# Patient Record
Sex: FEMALE | Race: WHITE | Employment: OTHER | URBAN - METROPOLITAN AREA
[De-identification: names, ages, dates, MRNs, and addresses within clinical notes are randomized per-mention and may not be internally consistent; named-entity substitution may affect disease eponyms.]

---

## 2017-10-25 ENCOUNTER — HOSPITAL ENCOUNTER (OUTPATIENT)
Dept: MAMMOGRAPHY | Age: 62
Discharge: HOME | End: 2017-10-25
Attending: OBSTETRICS & GYNECOLOGY
Payer: COMMERCIAL

## 2017-10-25 DIAGNOSIS — Z12.31 VISIT FOR SCREENING MAMMOGRAM: ICD-10-CM

## 2017-10-25 PROCEDURE — 77067 SCR MAMMO BI INCL CAD: CPT

## 2018-10-31 ENCOUNTER — HOSPITAL ENCOUNTER (OUTPATIENT)
Dept: MAMMOGRAPHY | Age: 63
Discharge: HOME | End: 2018-10-31
Attending: OBSTETRICS & GYNECOLOGY
Payer: COMMERCIAL

## 2018-10-31 DIAGNOSIS — Z12.39 SCREENING BREAST EXAMINATION: ICD-10-CM

## 2018-10-31 PROCEDURE — 77067 SCR MAMMO BI INCL CAD: CPT

## 2020-01-13 ENCOUNTER — HOSPITAL ENCOUNTER (OUTPATIENT)
Dept: MAMMOGRAPHY | Age: 65
Discharge: HOME | End: 2020-01-13
Attending: OBSTETRICS & GYNECOLOGY
Payer: COMMERCIAL

## 2020-01-13 DIAGNOSIS — Z12.31 VISIT FOR SCREENING MAMMOGRAM: ICD-10-CM

## 2020-01-13 PROCEDURE — 77067 SCR MAMMO BI INCL CAD: CPT

## 2021-11-03 ENCOUNTER — TRANSCRIBE ORDER (OUTPATIENT)
Dept: SCHEDULING | Age: 66
End: 2021-11-03

## 2021-11-03 DIAGNOSIS — Z12.31 SCREENING MAMMOGRAM FOR HIGH-RISK PATIENT: Primary | ICD-10-CM

## 2021-12-09 ENCOUNTER — HOSPITAL ENCOUNTER (OUTPATIENT)
Dept: MAMMOGRAPHY | Age: 66
Discharge: HOME | End: 2021-12-09
Attending: OBSTETRICS & GYNECOLOGY
Payer: COMMERCIAL

## 2021-12-09 DIAGNOSIS — Z12.31 SCREENING MAMMOGRAM FOR HIGH-RISK PATIENT: ICD-10-CM

## 2021-12-09 PROCEDURE — 77063 BREAST TOMOSYNTHESIS BI: CPT

## 2021-12-09 PROCEDURE — 77067 SCR MAMMO BI INCL CAD: CPT

## 2023-02-16 ENCOUNTER — TRANSCRIBE ORDER (OUTPATIENT)
Dept: MAMMOGRAPHY | Age: 68
End: 2023-02-16

## 2023-02-16 DIAGNOSIS — Z12.31 VISIT FOR SCREENING MAMMOGRAM: Primary | ICD-10-CM

## 2023-02-17 ENCOUNTER — HOSPITAL ENCOUNTER (OUTPATIENT)
Dept: MAMMOGRAPHY | Age: 68
End: 2023-02-17
Payer: MEDICARE

## 2023-02-17 DIAGNOSIS — Z12.31 VISIT FOR SCREENING MAMMOGRAM: ICD-10-CM

## 2023-02-17 PROCEDURE — 77063 BREAST TOMOSYNTHESIS BI: CPT

## 2024-04-23 ENCOUNTER — HOSPITAL ENCOUNTER (OUTPATIENT)
Facility: HOSPITAL | Age: 69
Discharge: HOME | End: 2024-04-26
Attending: OBSTETRICS & GYNECOLOGY
Payer: MEDICARE

## 2024-04-23 VITALS — WEIGHT: 160 LBS | HEIGHT: 69 IN | BODY MASS INDEX: 23.7 KG/M2

## 2024-04-23 DIAGNOSIS — Z12.31 SCREENING MAMMOGRAM FOR HIGH-RISK PATIENT: ICD-10-CM

## 2024-04-23 PROCEDURE — 77067 SCR MAMMO BI INCL CAD: CPT

## 2024-12-06 ENCOUNTER — TRANSCRIBE ORDERS (OUTPATIENT)
Facility: HOSPITAL | Age: 69
End: 2024-12-06

## 2024-12-06 DIAGNOSIS — M47.819 SPONDYLOSIS WITHOUT MYELOPATHY: Primary | ICD-10-CM

## 2025-01-10 ENCOUNTER — HOSPITAL ENCOUNTER (OUTPATIENT)
Facility: HOSPITAL | Age: 70
Discharge: HOME | End: 2025-01-13
Payer: MEDICARE

## 2025-01-10 DIAGNOSIS — M47.819 SPONDYLOSIS WITHOUT MYELOPATHY: ICD-10-CM

## 2025-01-10 PROCEDURE — 72148 MRI LUMBAR SPINE W/O DYE: CPT

## 2025-04-28 ENCOUNTER — TRANSCRIBE ORDERS (OUTPATIENT)
Facility: HOSPITAL | Age: 70
End: 2025-04-28

## 2025-04-28 DIAGNOSIS — Z12.31 SCREENING MAMMOGRAM, ENCOUNTER FOR: Primary | ICD-10-CM

## 2025-04-28 DIAGNOSIS — M81.0 AGE-RELATED OSTEOPOROSIS WITHOUT CURRENT PATHOLOGICAL FRACTURE: Primary | ICD-10-CM

## 2025-05-21 NOTE — PRE-PROCEDURE INSTRUCTIONS
5/21/2025  3:03 p.m.  Spoke with patient and confirmed arrival time of 10:15 a.m. for appointments scheduled on 5/28/2025; instructions given.

## 2025-05-28 ENCOUNTER — HOSPITAL ENCOUNTER (OUTPATIENT)
Facility: HOSPITAL | Age: 70
Discharge: HOME | End: 2025-05-31
Payer: MEDICARE

## 2025-05-28 VITALS — WEIGHT: 160 LBS | HEIGHT: 69 IN | BODY MASS INDEX: 23.7 KG/M2

## 2025-05-28 DIAGNOSIS — M81.0 AGE-RELATED OSTEOPOROSIS WITHOUT CURRENT PATHOLOGICAL FRACTURE: ICD-10-CM

## 2025-05-28 DIAGNOSIS — Z12.31 SCREENING MAMMOGRAM, ENCOUNTER FOR: ICD-10-CM

## 2025-05-28 PROCEDURE — 77080 DXA BONE DENSITY AXIAL: CPT

## 2025-05-28 PROCEDURE — 77063 BREAST TOMOSYNTHESIS BI: CPT

## 2025-06-12 ENCOUNTER — ANESTHESIA (OUTPATIENT)
Facility: HOSPITAL | Age: 70
End: 2025-06-12
Payer: MEDICARE

## 2025-06-12 ENCOUNTER — HOSPITAL ENCOUNTER (OUTPATIENT)
Facility: HOSPITAL | Age: 70
Setting detail: OUTPATIENT SURGERY
Discharge: HOME | End: 2025-06-12
Attending: INTERNAL MEDICINE | Admitting: INTERNAL MEDICINE
Payer: MEDICARE

## 2025-06-12 ENCOUNTER — ANESTHESIA EVENT (OUTPATIENT)
Facility: HOSPITAL | Age: 70
End: 2025-06-12
Payer: MEDICARE

## 2025-06-12 VITALS
OXYGEN SATURATION: 100 % | RESPIRATION RATE: 12 BRPM | HEIGHT: 69 IN | SYSTOLIC BLOOD PRESSURE: 136 MMHG | TEMPERATURE: 98.3 F | HEART RATE: 79 BPM | DIASTOLIC BLOOD PRESSURE: 69 MMHG | BODY MASS INDEX: 21.71 KG/M2 | WEIGHT: 146.61 LBS

## 2025-06-12 PROCEDURE — 88305 TISSUE EXAM BY PATHOLOGIST: CPT

## 2025-06-12 PROCEDURE — 2580000003 HC RX 258: Performed by: INTERNAL MEDICINE

## 2025-06-12 PROCEDURE — 3600007512: Performed by: INTERNAL MEDICINE

## 2025-06-12 PROCEDURE — 6360000002 HC RX W HCPCS: Performed by: NURSE ANESTHETIST, CERTIFIED REGISTERED

## 2025-06-12 PROCEDURE — 7100000011 HC PHASE II RECOVERY - ADDTL 15 MIN: Performed by: INTERNAL MEDICINE

## 2025-06-12 PROCEDURE — 3700000001 HC ADD 15 MINUTES (ANESTHESIA): Performed by: INTERNAL MEDICINE

## 2025-06-12 PROCEDURE — 7100000010 HC PHASE II RECOVERY - FIRST 15 MIN: Performed by: INTERNAL MEDICINE

## 2025-06-12 PROCEDURE — 2709999900 HC NON-CHARGEABLE SUPPLY: Performed by: INTERNAL MEDICINE

## 2025-06-12 PROCEDURE — 3700000000 HC ANESTHESIA ATTENDED CARE: Performed by: INTERNAL MEDICINE

## 2025-06-12 PROCEDURE — 3600007502: Performed by: INTERNAL MEDICINE

## 2025-06-12 RX ORDER — SODIUM CHLORIDE 9 MG/ML
INJECTION, SOLUTION INTRAVENOUS CONTINUOUS
Status: DISCONTINUED | OUTPATIENT
Start: 2025-06-12 | End: 2025-06-12 | Stop reason: HOSPADM

## 2025-06-12 RX ORDER — PROPOFOL 10 MG/ML
INJECTION, EMULSION INTRAVENOUS
Status: DISCONTINUED | OUTPATIENT
Start: 2025-06-12 | End: 2025-06-12 | Stop reason: SDUPTHER

## 2025-06-12 RX ORDER — PHENYLEPHRINE HCL IN 0.9% NACL 0.4MG/10ML
SYRINGE (ML) INTRAVENOUS
Status: DISCONTINUED | OUTPATIENT
Start: 2025-06-12 | End: 2025-06-12 | Stop reason: SDUPTHER

## 2025-06-12 RX ORDER — PRAVASTATIN SODIUM 40 MG
40 TABLET ORAL NIGHTLY
COMMUNITY

## 2025-06-12 RX ORDER — LISINOPRIL 40 MG/1
40 TABLET ORAL DAILY
COMMUNITY
Start: 2025-05-01

## 2025-06-12 RX ORDER — AMLODIPINE BESYLATE 10 MG/1
10 TABLET ORAL DAILY
COMMUNITY
Start: 2025-04-07

## 2025-06-12 RX ORDER — LIDOCAINE HYDROCHLORIDE 20 MG/ML
INJECTION, SOLUTION EPIDURAL; INFILTRATION; INTRACAUDAL; PERINEURAL
Status: DISCONTINUED | OUTPATIENT
Start: 2025-06-12 | End: 2025-06-12 | Stop reason: SDUPTHER

## 2025-06-12 RX ORDER — UBIDECARENONE 75 MG
50 CAPSULE ORAL DAILY
COMMUNITY

## 2025-06-12 RX ORDER — MULTIVIT WITH MINERALS/LUTEIN
250 TABLET ORAL DAILY
COMMUNITY

## 2025-06-12 RX ADMIN — SODIUM CHLORIDE: 9 INJECTION, SOLUTION INTRAVENOUS at 10:53

## 2025-06-12 RX ADMIN — PROPOFOL 50 MG: 10 INJECTION, EMULSION INTRAVENOUS at 10:58

## 2025-06-12 RX ADMIN — Medication 100 MCG: at 11:13

## 2025-06-12 RX ADMIN — Medication 100 MCG: at 11:08

## 2025-06-12 RX ADMIN — PROPOFOL 30 MG: 10 INJECTION, EMULSION INTRAVENOUS at 11:05

## 2025-06-12 RX ADMIN — PROPOFOL 150 MCG/KG/MIN: 10 INJECTION, EMULSION INTRAVENOUS at 10:59

## 2025-06-12 RX ADMIN — LIDOCAINE HYDROCHLORIDE 50 MG: 20 INJECTION, SOLUTION EPIDURAL; INFILTRATION; INTRACAUDAL; PERINEURAL at 10:58

## 2025-06-12 ASSESSMENT — PAIN - FUNCTIONAL ASSESSMENT: PAIN_FUNCTIONAL_ASSESSMENT: 0-10

## 2025-06-12 ASSESSMENT — PAIN SCALES - GENERAL: PAINLEVEL_OUTOF10: 0

## 2025-06-12 NOTE — H&P
[facility]  Rich Rice M.D.  [phone]    History and Physical       NAME:  Miriam Lee   :   1955   MRN:   [id number]           Consult Date: 2025 10:53 AM    Chief Complaint:  Colon cancer screening    History of Present Illness:  Patient is a 70 y.o. who is seen for colon cancer screening. Denies any ongoing GI complaints.      PMH:  Past Medical History:   Diagnosis Date    Hyperlipidemia     Hypertension        PSH:  Past Surgical History:   Procedure Laterality Date    FIXATION KYPHOPLASTY LUMBAR SPINE      HYSTERECTOMY (CERVIX STATUS UNKNOWN)         Allergies:  No Known Allergies    Home Medications:  Prior to Admission Medications   Prescriptions Last Dose Informant Patient Reported? Taking?   Ascorbic Acid (VITAMIN C) 250 MG tablet 2025 Morning  Yes Yes   Sig: Take 1 tablet by mouth daily   amLODIPine (NORVASC) 10 MG tablet 6/10/2025 Bedtime  Yes Yes   Sig: Take 1 tablet by mouth daily   lisinopril (PRINIVIL;ZESTRIL) 40 MG tablet 6/10/2025 Bedtime  Yes Yes   Sig: Take 1 tablet by mouth daily   pravastatin (PRAVACHOL) 40 MG tablet 6/10/2025 Bedtime  Yes No   Sig: Take 1 tablet by mouth nightly at bedtime   vitamin B-12 (CYANOCOBALAMIN) 100 MCG tablet 2025 Morning  Yes Yes   Sig: Take 0.5 tablets by mouth daily   vitamin D (CHOLECALCIFEROL) 50 MCG (2000 UT) CAPS capsule 6/10/2025 Evening  Yes No   Sig: Take 1 capsule by mouth daily      Facility-Administered Medications: None       Hospital Medications:  Current Facility-Administered Medications   Medication Dose Route Frequency    0.9 % sodium chloride infusion   IntraVENous Continuous       Social History:  Social History     Tobacco Use    Smoking status: Every Day     Current packs/day: 0.25     Types: Cigarettes    Smokeless tobacco: Never   Substance Use Topics    Alcohol use: Yes     Alcohol/week: 3.0 standard drinks of alcohol     Types: 3 Glasses of wine per week       Family History:  Family  History   Problem Relation Age of Onset    Hypertension Mother     Diabetes Father     Hypertension Father     Kidney Disease Father              Review of Systems:      Constitutional: negative fever, negative chills, negative weight loss  Eyes:   negative visual changes  ENT:   negative sore throat, tongue or lip swelling  Respiratory:  negative cough, negative dyspnea  Cards:  negative for chest pain, palpitations, lower extremity edema  GI:   See HPI  :  negative for frequency, dysuria  Integument:  negative for rash and pruritus  Heme:  negative for easy bruising and gum/nose bleeding  Musculoskel: negative for myalgias,  back pain and muscle weakness  Neuro: negative for headaches, dizziness, vertigo  Psych:  negative for feelings of anxiety, depression       Objective:   Patient Vitals for the past 8 hrs:   BP Temp Pulse Resp SpO2 Height Weight   06/12/25 0925 129/62 98.3 °F (36.8 °C) 86 16 100 % 1.753 m (5' 9\") 66.5 kg (146 lb 9.7 oz)     No intake/output data recorded.  No intake/output data recorded.    EXAM:     NEURO-a&o   HEENT-wnl   LUNGS-clear    COR-regular rate and rhythym     ABD-soft , no tenderness, no rebound, good bs     EXT-no edema     Data Review     No results for input(s): \"WBC\", \"HGB\", \"HCT\", \"PLT\" in the last 72 hours.  No results for input(s): \"NA\", \"K\", \"CL\", \"CO2\", \"BUN\", \"GLU\", \"PHOS\" in the last 72 hours.    Invalid input(s): \"CREA\", \"CA\"  No results for input(s): \"TP\", \"GLOB\", \"GGT\" in the last 72 hours.    Invalid input(s): \"SGOT\", \"GPT\", \"AP\", \"TBIL\", \"ALB\", \"AML\", \"AMYP\", \"LPSE\", \"HLPSE\"  No results for input(s): \"INR\", \"APTT\" in the last 72 hours.    Invalid input(s): \"PTP\"    There is no problem list on file for this patient.     Assessment:     Colon cancer screening   Plan:     Colonoscopy today.     Signed By: Rich Rice MD     6/12/2025  10:53 AM

## 2025-06-12 NOTE — PERIOP NOTE
Received recovery report from anesthesia team, see anesthesia note. Abdomen remains soft and non-tender post-procedure. Pt has no complaints at this time and tolerated procedure well. Endoscope was pre-cleaned at the bedside by Maria Ines Magdaleno immediately following procedure. Post recovery report given to Soraya Ferrara.

## 2025-06-12 NOTE — OP NOTE
[facility]  Rich Rice M.D.  [phone]            2025          Colonoscopy Operative Report  Miriam Lee  :  1955  [facility] Medical Record Number:  [id number]      Indications:    Screening colonoscopy     :  Rich Rice MD    Referring Provider: Aracely Lam APRN - NP    Sedation:  MAC anesthesia    Pre-Procedural Exam:      Airway: clear,  No airway problems anticipated  Heart: RRR, without gallops or rubs  Lungs: clear bilaterally without wheezes, crackles, or rhonchi  Abdomen: soft, nontender, nondistended, bowel sounds present  Mental Status: awake, alert and oriented to person, place and time     Procedure Details:  After informed consent was obtained with all risks and benefits of procedure explained and preoperative exam completed, the patient was taken to the endoscopy suite and placed in the left lateral decubitus position.  Upon sequential sedation as per above, a digital rectal exam was performed. The Olympus videocolonoscope  was inserted in the rectum and carefully advanced to the cecum, which was identified by the ileocecal valve and appendiceal orifice.  The quality of preparation was good.  The colonoscope was slowly withdrawn with careful inspection and evaluation between folds. Retroflexion in the rectum was performed.    Findings:   Terminal Ileum: not intubated  Cecum: normal  Ascending Colon: normal  Transverse Colon: normal  Descending Colon: normal  Sigmoid: mild diverticulosis; tortuous lumen, probably related to previous hysterectomy  Rectum: 1  Pedunculated polyp(s), the largest 15 mm in size  Grade 1 internal hemorrhoid(s);    Interventions:  1 complete polypectomy were performed using hot snare  and the polyps were  retrieved    Specimen Removed:  specimen #1, 15 mm in size, located in the rectum removed by snare cautery and retrieved for pathology    Complications: None.     EBL:  None.    Impression:  One polyp removed from

## 2025-06-12 NOTE — DISCHARGE INSTRUCTIONS
[facility]  Rich Rice M.D.  [phone]            COLON DISCHARGE INSTRUCTIONS       2025    Miriam Lee  :  1955  [facility] Medical Record Number:  [id number]      COLONOSCOPY FINDINGS:  Your colonoscopy showed: One polyp that was removed and sent to pathology, mild diverticulosis, otherwise normal mucosa.    DISCOMFORT:  Redness at IV site- apply warm compress to area; if redness or soreness persist- contact your physician  There may be a slight amount of blood passed from the rectum  Gaseous discomfort- walking, belching will help relieve any discomfort  You may not operate a vehicle for 12 hours  You may not engage in an occupation involving machinery or appliances for rest of today  You may not drink alcoholic beverages for at least 12 hours  Avoid making any critical decisions for at least 24 hour  DIET:   High fiber diet   - however -  remember your colon is empty and a heavy meal will produce gas.   Avoid these foods:  vegetables, fried / greasy foods, carbonated drinks for today     ACTIVITY:  You may resume your normal daily activities it is recommended that you spend the remainder of the day resting -  avoid any strenuous activity.    CALL M.DMary  ANY SIGN OF:   Increasing pain, nausea, vomiting  Abdominal distension (swelling)  New increased bleeding (oral or rectal)  Fever (chills)  Pain in chest area  Bloody discharge from nose or mouth   Shortness of breath    Follow-up Instructions:   Call Dr. Rice if any questions or problems.   Telephone # [phone]  Biopsy results will be available in  5 to 7 days  Likely repeat colonoscopy in 3 years.

## 2025-06-12 NOTE — ANESTHESIA POSTPROCEDURE EVALUATION
Department of Anesthesiology  Postprocedure Note    Patient: Miriam Lee  MRN: [id number]  YOB: 1955  Date of evaluation: 6/12/2025    Procedure Summary       Date: 06/12/25 Room / Location: [facility] 03 / [facility] ENDOSCOPY    Anesthesia Start: 1053 Anesthesia Stop: 1122    Procedures:       COLONOSCOPY/BIOPSY/POLYPECTOMY (Lower GI Region)      COLONOSCOPY POLYPECTOMY HOT BIOPSY (Lower GI Region) Diagnosis:       Colon cancer screening      (Colon cancer screening [Z12.11])    Surgeons: Rich Rice MD Responsible Provider: Oscar Stewart MD    Anesthesia Type: MAC ASA Status: 2            Anesthesia Type: No value filed.    King Phase I: King Score: 10    King Phase II: King Score: 10    Anesthesia Post Evaluation    Patient location during evaluation: PACU  Patient participation: complete - patient participated  Level of consciousness: awake  Airway patency: patent  Nausea & Vomiting: no vomiting and no nausea  Cardiovascular status: hemodynamically stable  Respiratory status: acceptable  Hydration status: stable  Pain management: adequate    No notable events documented.

## 2025-06-12 NOTE — ANESTHESIA PRE PROCEDURE
hypertension:, hyperlipidemia        Rhythm: regular  Rate: normal                    Neuro/Psych:   Negative Neuro/Psych ROS              GI/Hepatic/Renal:   (+) bowel prep          Endo/Other: Negative Endo/Other ROS                    Abdominal:             Vascular: negative vascular ROS.         Other Findings:       Anesthesia Plan      MAC     ASA 2       Induction: intravenous.      Anesthetic plan and risks discussed with patient.      Plan discussed with CRNA.                Oscar Stewart MD   6/12/2025

## (undated) DEVICE — ELECTRODE PT RET AD L9FT HI MOIST COND ADH HYDRGEL CORDED